# Patient Record
Sex: MALE | Race: WHITE | Employment: OTHER | URBAN - NONMETROPOLITAN AREA
[De-identification: names, ages, dates, MRNs, and addresses within clinical notes are randomized per-mention and may not be internally consistent; named-entity substitution may affect disease eponyms.]

---

## 2021-05-24 LAB
ALBUMIN SERPL-MCNC: 3.8 G/DL
ALP BLD-CCNC: 50 U/L
ALT SERPL-CCNC: 61 U/L
ANION GAP SERPL CALCULATED.3IONS-SCNC: 17 MMOL/L
AST SERPL-CCNC: 42 U/L
BILIRUB SERPL-MCNC: 0.3 MG/DL (ref 0.1–1.4)
BUN BLDV-MCNC: 44 MG/DL
CALCIUM SERPL-MCNC: 8.9 MG/DL
CHLORIDE BLD-SCNC: 106 MMOL/L
CHOLESTEROL, TOTAL: 108 MG/DL
CHOLESTEROL/HDL RATIO: NORMAL
CO2: 24 MMOL/L
CREAT SERPL-MCNC: 2 MG/DL
GFR CALCULATED: 35
GLUCOSE BLD-MCNC: 124 MG/DL
HDLC SERPL-MCNC: 38 MG/DL (ref 35–70)
LDL CHOLESTEROL CALCULATED: 42 MG/DL (ref 0–160)
NONHDLC SERPL-MCNC: NORMAL MG/DL
POTASSIUM SERPL-SCNC: 5.2 MMOL/L
SODIUM BLD-SCNC: 142 MMOL/L
TOTAL PROTEIN: 7.3
TRIGL SERPL-MCNC: 142 MG/DL
VLDLC SERPL CALC-MCNC: 28 MG/DL

## 2021-06-30 PROBLEM — Z87.442 HISTORY OF KIDNEY STONES: Status: ACTIVE | Noted: 2020-12-18

## 2021-06-30 PROBLEM — R35.1 NOCTURIA: Status: ACTIVE | Noted: 2020-12-18

## 2021-06-30 PROBLEM — N40.1 BENIGN PROSTATIC HYPERPLASIA WITH WEAK URINARY STREAM: Status: ACTIVE | Noted: 2020-12-18

## 2021-06-30 PROBLEM — R39.12 BENIGN PROSTATIC HYPERPLASIA WITH WEAK URINARY STREAM: Status: ACTIVE | Noted: 2020-12-18

## 2021-06-30 PROBLEM — N39.0 LOWER URINARY TRACT INFECTIOUS DISEASE: Status: ACTIVE | Noted: 2020-12-28

## 2021-06-30 PROBLEM — R97.20 ELEVATED PSA: Status: ACTIVE | Noted: 2020-12-18

## 2021-06-30 RX ORDER — ASPIRIN 81 MG/1
81 TABLET ORAL DAILY
COMMUNITY

## 2021-06-30 RX ORDER — FINASTERIDE 5 MG/1
5 TABLET, FILM COATED ORAL DAILY
COMMUNITY
Start: 2020-12-18 | End: 2021-10-07 | Stop reason: ALTCHOICE

## 2021-07-01 ENCOUNTER — OFFICE VISIT (OUTPATIENT)
Dept: NEPHROLOGY | Age: 66
End: 2021-07-01
Payer: MEDICARE

## 2021-07-01 VITALS
DIASTOLIC BLOOD PRESSURE: 80 MMHG | HEART RATE: 82 BPM | SYSTOLIC BLOOD PRESSURE: 152 MMHG | WEIGHT: 270 LBS | OXYGEN SATURATION: 96 %

## 2021-07-01 DIAGNOSIS — N18.32 STAGE 3B CHRONIC KIDNEY DISEASE (HCC): Primary | ICD-10-CM

## 2021-07-01 PROBLEM — E29.1 TESTICULAR HYPOFUNCTION: Status: ACTIVE | Noted: 2021-07-01

## 2021-07-01 PROBLEM — E78.00 HYPERCHOLESTEROLEMIA: Status: ACTIVE | Noted: 2021-07-01

## 2021-07-01 PROBLEM — J38.00 VOCAL CORD PALSY: Status: ACTIVE | Noted: 2021-07-01

## 2021-07-01 PROBLEM — N28.9 KIDNEY DISORDER: Status: ACTIVE | Noted: 2021-07-01

## 2021-07-01 PROBLEM — R49.0 HOARSENESS: Status: ACTIVE | Noted: 2021-07-01

## 2021-07-01 PROCEDURE — 99204 OFFICE O/P NEW MOD 45 MIN: CPT | Performed by: INTERNAL MEDICINE

## 2021-07-01 RX ORDER — ALLOPURINOL 100 MG/1
50 TABLET ORAL DAILY
Qty: 15 TABLET | Refills: 5 | Status: SHIPPED | OUTPATIENT
Start: 2021-07-01 | End: 2021-10-21 | Stop reason: SDUPTHER

## 2021-07-01 RX ORDER — LOSARTAN POTASSIUM 100 MG/1
100 TABLET ORAL DAILY
Qty: 30 TABLET | Refills: 5 | Status: SHIPPED | OUTPATIENT
Start: 2021-07-01 | End: 2022-04-07

## 2021-07-01 NOTE — PATIENT INSTRUCTIONS
Stop Lisinopril-hydrochlorothiazide. Start losartan 100 mg daily. Start allopurinol 50 mg daily. bloodwork 2 weeks    Low potassium diet  Low Potassium Diet:        Foods are low in potassium. Plan to eat these every day. But don't eat more than 4 servings a day. A serving equals 1 small piece of fruit or ½ cup.  Fresh fruit: Apples, applesauce, blueberries, grapes, pineapple, plums, watermelon    Canned fruit: Peaches and pears.  Vegetables: Green or wax beans,broccoli, cabbage, carrots, corn, lettuce, radishes, green peas spinach.  Cheese, Pasta, rice, bread     Foods are high in potassium. Don't eat more than 1 serving of these a day. A serving equals 1 small piece of fruit or ½ cup.  Fresh fruit: Blackberries, boysenberries, cherries, grapefruit, strawberries   Vegetables: Asparagus, carrots, beets, corn, turnips, canned tomatoes, white mushrooms, and zucchini.  Milk and yogurt. Don't eat more than 1 cup a day.  Foods are very high in potassium. Avoid these foods.  Fruits: Apricots, bananas, dates, figs, honeydew, raisins, prunes, kiwi fruit, nectarines, oranges, and orange juice, watermelon   Vegetables: Avocados, artichokes, brussels sprouts, potatoes, leafy   green vegetables (such as spinach), winter squash, fresh tomatoes, tomato paste, yams, and dried peas, beans, and lentils.  Clams, chocolate, nuts, seeds, molasses, and sardines. Lower potassium in potatoes by \"leaching\". Boil the potatoes in water and then drain the liquid off. Repeat the rinse twice. Dahlia Perez Do not use     salt substitute or \"lite\" salt,     Sean Lite Salt    No Salt, Nu Salt.   These often are very high in potassium.       Mrs Jovanni Bertrand is ok to use since it does not contain potassium      Potassium Content of Foods   (listing by high to low content)    Food  Serving Size Potassium (mg)   Baked potato (flesh only) one medium 610   Sweet potato, baked one medium 542   Banana, raw  one medium 422 Spinach, cooked ½ C 420   Bailey beans, cooked ½ C 373   Kidney beans, cooked ½ C 371   Lentils, cooked ½ C 366   Navy beans, cooked ½ C 354   Plums, dried, pitted five 350   Artichokes, cooked one medium 343   Mashed potatoes ½ C 343   Edamame/soybeans, green ½ C 338   Tomato, canned  ½ C 325   Raisins ¼ C 299   Tomato, raw one medium 292   Papaya one small 286   Peach one medium 285   Pistachios, dry roasted, salted  1 oz (47 nuts) 285   Pumpkin, cooked, mashed ½ C 282   French fries 10 fries 278   Mushrooms, white, cooked ½ C 278   Beets, cooked ½ C 259   [place] sprouts, cooked ½ C 247   Kiwi one medium 237   Orange, raw one medium 237   Green peas, cooked ½ C 217   Cantaloupe, raw ½ C 214   Pear, raw one medium 212   Almonds, dry roasted 1 oz (24 nuts) 202   Apricot, canned, juice pack ½ C 202   Asparagus, cooked ½ C 202   [place] squash, cooked ½ C 201   Apple, raw with skin one medium 195   Honeydew ½ C 194   Carrots, cooked ½ C 183   Onions, cooked ½ C 175   Spinach, raw 1 C 167   Corn, sweet yellow ½ C 163   Red concepcion pepper ½ C 157   Kale, cooked ½ C 150   Cabbage, cooked ½ C 147   Mustard greens, cooked ½ C 142   [place] ½ C 139   Figs, dried two figs 134   Summer squash, cooked ½ C 126   Grapes 10 grapes 120   Okra, cooked ½ C 108   Bamboo shoots, canned 1 C 108   Celery, raw one stalk 105   Peanut butter, creamy 1 Tbsp 104   Green beans, cooked ½ C 104   Cauliflower, cooked ½ C 91   Mushrooms, shitake, cooked ½ C 88   Watermelon ½ C 85   Cucumber, peeled ½ C 88   Iceberg lettuce 1 C 81   Tomato, sun dried one piece 80   Eggplant, cooked ½ C 69   Pickle one pickle 61   Ketchup 1 Tbsp 60   Radishes one radish 57     5   C=cup, mg=milligrams, oz=ounces, Tbsp=tablespoon    Reference   [employer], [address], [employer]  [employer] Database for Standard Reference, Release 25: Potassium, K (mg) content of selected foods per common measure

## 2021-07-01 NOTE — PROGRESS NOTES
[address] [place] [address] [address] [id number]  Dept: [phone]  Loc: [phone]  Outpatient Consult  [phone]  7/1/2021 10:01 AM EDT        Pt Name:    Shani Stahl  YOB: 1955  Primary Care Physician:  Yaya Simpson MD     Chief Complaint:   Chief Complaint   Patient presents with    New Patient     CKD 3        Background Information/Interval History:   The patient is a 74y.o. year old  male referred by Dr. Howard Briscoe for CKD III. He previously followed with Dr. Fay Ratliff but hasn't seen him in 8 years. Baseline creatinine running 1.6-1.8 mg/dL. Was up to 2.0 in May. K 5.2. He has hx of DM, gout, left renal cyst, BPH, kidney stones. Follows with urology. Has been having a lot of issues with gout lately. Taking colcrys 0.6 mg daily for couple years. Takes prednisone for gout flares. No nsaid use/indomethacin. Uric acid was 10. Gets swelling in legs when has gout flare, otherwise denies swelling. Obesity. A1C 7.3          Allergies:  Ciprofloxacin, Other, Nsaids, and Sulfamethoxazole        Past Medical History:  Past Medical History:   Diagnosis Date    Chronic gout     Chronic kidney disease     Stage III    Colonic polyp     Diverticulosis of colon     Hyperlipidemia     Hyperlipidemia     Hypertension     Malignant essential hypertension     Osteoarthritis     Pyelonephritis     Type II or unspecified type diabetes mellitus without mention of complication, not stated as uncontrolled         Past Surgical History:  No past surgical history on file.      Family History:  Family History   Problem Relation Age of Onset    Diabetes Mother     Diabetes Father     Diabetes Sister     Diabetes Brother         Social History:  Social History     Socioeconomic History    Marital status: Not on file     Spouse name: Not on file    Number of children: Not on file    Years of education: Not on file    Highest education level: Not on file   Occupational History    Not on file   Tobacco Use    Smoking status: Never Smoker    Smokeless tobacco: Never Used   Vaping Use    Vaping Use: Never used   Substance and Sexual Activity    Alcohol use: Yes     Comment: moderate use 2 - 4 drinks weekends    Drug use: No    Sexual activity: Not on file   Other Topics Concern    Not on file   Social History Narrative    Not on file     Social Determinants of Health     Financial Resource Strain:     Difficulty of Paying Living Expenses:    Food Insecurity:     Worried About Running Out of Food in the Last Year:     Ran Out of Food in the Last Year:    Transportation Needs:     Lack of Transportation (Medical):  Lack of Transportation (Non-Medical):    Physical Activity:     Days of Exercise per Week:     Minutes of Exercise per Session:    Stress:     Feeling of Stress :    Social Connections:     Frequency of Communication with Friends and Family:     Frequency of Social Gatherings with Friends and Family:     Attends [employer] Services:     Active Member of Clubs or Organizations:     Attends Club or Organization Meetings:     Marital Status:    Intimate Partner Violence:     Fear of Current or Ex-Partner:     Emotionally Abused:     Physically Abused:     Sexually Abused:         Review of Systems:  Constitutional: no fever or chills  Head: No headaches  Eyes: no blurry vision, no discharge  Ears: no ear pain or hearing changes  Nose: no runny nose or epistaxis  Respiratory: no shortness of breath or cough or sputum production  Cardiovascular: no chest pain, + edema  GI: no nausea, no vomiting or diarrhea  : denies any hematuria, no flank pain  Skin: no rash  Musculoskeletal:+joint pains  Neuro: no tremor, no slurred speech  Psychiatric: stable mood, no depression or insomnia     Home Medications:  Prior to Admission medications    Medication Sig Start Date End Date Taking? Authorizing Provider   NONFORMULARY Take 0.6 mg by mouth daily Goutnil   Yes Historical Provider, MD   Multiple Vitamins-Minerals (VITAMIN D3 COMPLETE PO) Take by mouth   Yes Historical Provider, MD   aspirin 81 MG EC tablet Take 81 tablets by mouth daily   Yes Historical Provider, MD   finasteride (PROSCAR) 5 MG tablet Take 5 mg by mouth daily 12/18/20  Yes Historical Provider, MD   metformin (GLUCOPHAGE) 500 MG tablet Take 1,000 mg by mouth 2 times daily (with meals). Yes Historical Provider, MD   lisinopril-hydrochlorothiazide (PRINZIDE;ZESTORETIC) 20-12.5 MG per tablet Take 1 tablet by mouth daily. Yes Historical Provider, MD   glipiZIDE (GLUCOTROL) 10 MG CR tablet Take 10 mg by mouth daily. Yes Historical Provider, MD   pioglitazone (ACTOS) 15 MG tablet Take 15 mg by mouth daily. Yes Historical Provider, MD   simvastatin (ZOCOR) 20 MG tablet Take 20 mg by mouth nightly. Yes Historical Provider, MD   amLODIPine (NORVASC) 10 MG tablet Take 10 mg by mouth daily. Yes Historical Provider, MD        Physical Examination:  VITALS:  BP (!) 152/80 (Site: Right Upper Arm, Position: Sitting, Cuff Size: Medium Adult)   Pulse 82   Wt 270 lb (122.5 kg)   SpO2 96%   There is no height or weight on file to calculate BMI. Wt Readings from Last 3 Encounters:   07/01/21 270 lb (122.5 kg)   03/08/13 166 lb (75.3 kg)     Constitutional and General Appearance: alert and cooperative with exam, appears comfortable, no distress  Eyes: no icteric sclera, no pallor conjunctiva, no discharge seen from either eye  Ears and Nose: normal external appearance of left and right ear and nose. No active drainage from nose.    Oral: moist oral mucus membranes  Neck: No jugular venous distention, appears symmetric, good ROM  Lungs: Air entry B/L, no crackles or rales, no use of accessory muscles  Heart: regular rate, S1, S2  Extremities: trace LE edema noted  GI: soft, non-tender, no guarding  Skin: no rash seen on exposed extremities  Musculo: moves all extremities  Neuro: no slurred speech, no facial drooping, no asterixis  Psychiatric: Awake, alert, Oriented     Laboratory & Diagnostics:  CBC: No results found for: WBC, HGB, HCT, MCV, PLT  BMP:    Lab Results   Component Value Date     05/24/2021    K 5.2 05/24/2021     05/24/2021    CO2 24 05/24/2021    BUN 44 05/24/2021    CREATININE 2.0 05/24/2021    GLUCOSE 124 05/24/2021      Hepatic:   Lab Results   Component Value Date    AST 42 05/24/2021    ALT 61 05/24/2021    BILITOT 0.3 05/24/2021    ALKPHOS 50 05/24/2021     BNP: No results found for: BNP  Lipids:   Lab Results   Component Value Date    CHOL 108 05/24/2021    HDL 38 05/24/2021     INR: No results found for: INR  URINE: No results found for: NAUR, PROTUR  No results found for: NITRU, COLORU, PHUR, LABCAST, WBCUA, RBCUA, MUCUS, TRICHOMONAS, YEAST, BACTERIA, CLARITYU, SPECGRAV, LEUKOCYTESUR, UROBILINOGEN, BILIRUBINUR, BLOODU, GLUCOSEU, KETUA, AMORPHOUS   Microalbumen/Creatinine ratio:  No components found for: RUCREAT        Impression/Plan:   1. 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 Goals of care include slowing the rate of progression by controlling blood pressures, blood glucose, albuminuria, and avoiding nephrotoxic agents such as NSAIDs and IV contrast.   2. Gout: severe. Uric acid 10. Having frequent flares. Will stop lisinopril-HCTZ as the thiazide could be contributing to hyperuricemia. Pt appears to be on this for kidney stone prevention so I discussed he will be at higher risk for stones for he is ok with this. Advised to notify me if any swelling off thiazide and will start loop diuretic. Start losartan 100 mg daily as has some uricosuric effect. I would also recommend starting low dose of allopurinol 50 mg daily  3. Mild hyperkalemia 5.2. Discussed low K diet.   Since we are stopping the thiazide he is at risk for K increasing so need a repeat bmp in 2 weeks  4. HTN: monitor at home  5. DM, check urine MA  6. Kidney stones, follows with urology  7. Renal cyst  8. obesity    Return in 3 months. Thought process was discussed with the patient.   Thank you for the referral.  Please do not hesitate to contact me if you have any questions or concerns        Deanna Banegas, DO  [facility]

## 2021-07-15 LAB
BUN BLDV-MCNC: 31 MG/DL
CALCIUM SERPL-MCNC: 9.4 MG/DL
CHLORIDE BLD-SCNC: 102 MMOL/L
CO2: 26 MMOL/L
CREAT SERPL-MCNC: 1.5 MG/DL
GFR CALCULATED: 49
GLUCOSE BLD-MCNC: 233 MG/DL
POTASSIUM SERPL-SCNC: 4.9 MMOL/L
SODIUM BLD-SCNC: 139 MMOL/L

## 2021-07-16 ENCOUNTER — TELEPHONE (OUTPATIENT)
Dept: NEPHROLOGY | Age: 66
End: 2021-07-16

## 2021-07-16 NOTE — TELEPHONE ENCOUNTER
----- Message from [id number] Lili Bocanegra DO sent at 7/16/2021 12:59 PM EDT -----  Kidney function improved, potassium is better  ----- Message -----  From: Khadijah Garcia MA  Sent: 7/16/2021   8:21 AM EDT  To: [id number] Lili Bocanegra DO

## 2021-07-23 ENCOUNTER — TELEPHONE (OUTPATIENT)
Dept: NEPHROLOGY | Age: 66
End: 2021-07-23

## 2021-07-23 RX ORDER — METHYLPREDNISOLONE 4 MG/1
TABLET ORAL
Qty: 1 KIT | Refills: 0 | Status: SHIPPED | OUTPATIENT
Start: 2021-07-23 | End: 2021-07-29

## 2021-07-23 NOTE — TELEPHONE ENCOUNTER
Patient called today. He said that you had changed some of his medications and you gave him different medication for his gout. Now he has the worst case of gout in his life!! He has been fighting gout since May. He does not have any prednisone. He said the prednisone did help when he took it. He cannot walk from the chair to the couch. He uses [facility] [place].

## 2021-07-30 PROBLEM — N39.0 LOWER URINARY TRACT INFECTIOUS DISEASE: Status: RESOLVED | Noted: 2020-12-28 | Resolved: 2021-07-30

## 2021-08-02 ENCOUNTER — TELEPHONE (OUTPATIENT)
Dept: NEPHROLOGY | Age: 66
End: 2021-08-02

## 2021-08-02 NOTE — TELEPHONE ENCOUNTER
Today is the first day that he did not have to use crutches to get around. His feet and lower legs have been real swollen. He has gout. He wants to know if there is something else he can try to get the swelling down? Nothing seems to work. Please advise.

## 2021-08-05 ENCOUNTER — OFFICE VISIT (OUTPATIENT)
Dept: NEPHROLOGY | Age: 66
End: 2021-08-05
Payer: MEDICARE

## 2021-08-05 VITALS
DIASTOLIC BLOOD PRESSURE: 66 MMHG | HEART RATE: 75 BPM | SYSTOLIC BLOOD PRESSURE: 116 MMHG | OXYGEN SATURATION: 98 % | WEIGHT: 251 LBS

## 2021-08-05 DIAGNOSIS — N18.32 STAGE 3B CHRONIC KIDNEY DISEASE (HCC): Primary | ICD-10-CM

## 2021-08-05 DIAGNOSIS — M1A.9XX1 CHRONIC GOUT WITH TOPHUS, UNSPECIFIED CAUSE, UNSPECIFIED SITE: ICD-10-CM

## 2021-08-05 PROCEDURE — 1123F ACP DISCUSS/DSCN MKR DOCD: CPT | Performed by: INTERNAL MEDICINE

## 2021-08-05 PROCEDURE — 99213 OFFICE O/P EST LOW 20 MIN: CPT | Performed by: INTERNAL MEDICINE

## 2021-08-05 PROCEDURE — 1036F TOBACCO NON-USER: CPT | Performed by: INTERNAL MEDICINE

## 2021-08-05 PROCEDURE — G8421 BMI NOT CALCULATED: HCPCS | Performed by: INTERNAL MEDICINE

## 2021-08-05 PROCEDURE — 3017F COLORECTAL CA SCREEN DOC REV: CPT | Performed by: INTERNAL MEDICINE

## 2021-08-05 PROCEDURE — 4040F PNEUMOC VAC/ADMIN/RCVD: CPT | Performed by: INTERNAL MEDICINE

## 2021-08-05 PROCEDURE — G8427 DOCREV CUR MEDS BY ELIG CLIN: HCPCS | Performed by: INTERNAL MEDICINE

## 2021-08-05 RX ORDER — METHYLPREDNISOLONE 4 MG/1
TABLET ORAL
Qty: 1 KIT | Refills: 1 | Status: SHIPPED | OUTPATIENT
Start: 2021-08-05 | End: 2021-08-11

## 2021-08-05 NOTE — PROGRESS NOTES
[address] [place] [address] [address] [id number]  Dept: [phone]  Loc: [phone]  Progress Note  8/5/2021 10:23 AM      Pt Name:    Jos Gaming  YOB: 1955  Primary Care Physician:  Finesse Barrios MD     Chief Complaint:   Chief Complaint   Patient presents with    Follow-up     CKD III        History of Present Illness: This is a follow-up visit for CKD III . He has hx DM, gout, left renal cyst, BPH, kidney stones. Follows with urology. Baseline creat 1.6-1.8. Last visit I stopped his thiazide and changed lisinopril to losartan. Also started allopurinol 50 mg daily. Since then he has been having worsening gout. He got a medrol dose pack which helped. He was having swelling but this is better now. He takes a generic type of colcrys 0.6 mg daily that he got over the internet because it was cheaper. Pertinent items are noted in HPI. Past History:  Past Medical History:   Diagnosis Date    Chronic gout     Chronic kidney disease     Stage III    Colonic polyp     Diverticulosis of colon     Hyperlipidemia     Hyperlipidemia     Hypertension     Malignant essential hypertension     Osteoarthritis     Pyelonephritis     Type II or unspecified type diabetes mellitus without mention of complication, not stated as uncontrolled      No past surgical history on file. VITALS:  /66 (Site: Left Upper Arm, Position: Sitting, Cuff Size: Large Adult)   Pulse 75   Wt 251 lb (113.9 kg)   SpO2 98%   Wt Readings from Last 3 Encounters:   08/05/21 251 lb (113.9 kg)   07/01/21 270 lb (122.5 kg)   03/08/13 166 lb (75.3 kg)     There is no height or weight on file to calculate BMI.      General Appearance: alert and cooperative with exam, appears comfortable, no distress  HEENT: EOMI, moist oral mucus membranes  Neck: No jugular venous distention,  Lungs: Air entry B/L, no crackles or rales, no use of accessory muscles  Heart: S1, S2 heard, no rub  GI: soft, non-tender, no guarding  Extremities: trace LE edema noted  Skin: warm, dry  Neurologic: no tremor, no asterixis, no focal neurologic deficits     Medications:  Current Outpatient Medications   Medication Sig Dispense Refill    methylPREDNISolone (MEDROL DOSEPACK) 4 MG tablet Take by mouth for gout flare 1 kit 1    NONFORMULARY Take 0.6 mg by mouth daily Goutnil      Multiple Vitamins-Minerals (VITAMIN D3 COMPLETE PO) Take by mouth      losartan (COZAAR) 100 MG tablet Take 1 tablet by mouth daily 30 tablet 5    allopurinol (ZYLOPRIM) 100 MG tablet Take 0.5 tablets by mouth daily 15 tablet 5    aspirin 81 MG EC tablet Take 81 tablets by mouth daily      finasteride (PROSCAR) 5 MG tablet Take 5 mg by mouth daily      metformin (GLUCOPHAGE) 500 MG tablet Take 1,000 mg by mouth 2 times daily (with meals).  glipiZIDE (GLUCOTROL) 10 MG CR tablet Take 10 mg by mouth daily.  pioglitazone (ACTOS) 15 MG tablet Take 15 mg by mouth daily.  simvastatin (ZOCOR) 20 MG tablet Take 20 mg by mouth nightly.  amLODIPine (NORVASC) 10 MG tablet Take 10 mg by mouth daily. No current facility-administered medications for this visit.         Laboratory & Diagnostics:  CBC: No results found for: WBC, HGB, HCT, MCV, PLT  BMP:    Lab Results   Component Value Date     07/15/2021     05/24/2021    K 4.9 07/15/2021    K 5.2 05/24/2021     07/15/2021     05/24/2021    CO2 26 07/15/2021    CO2 24 05/24/2021    BUN 31 07/15/2021    BUN 44 05/24/2021    CREATININE 1.5 07/15/2021    CREATININE 2.0 05/24/2021    GLUCOSE 233 07/15/2021    GLUCOSE 124 05/24/2021      Hepatic:   Lab Results   Component Value Date    AST 42 05/24/2021    ALT 61 05/24/2021    BILITOT 0.3 05/24/2021    ALKPHOS 50 05/24/2021     BNP: No results found for: BNP  Lipids:   Lab Results   Component Value Date    CHOL 108 05/24/2021    HDL 38 05/24/2021     INR: No results found for: INR  URINE: No results found for: NAUR, PROTUR  No results found for: NITRU, COLORU, PHUR, LABCAST, WBCUA, RBCUA, MUCUS, TRICHOMONAS, YEAST, BACTERIA, CLARITYU, SPECGRAV, LEUKOCYTESUR, UROBILINOGEN, BILIRUBINUR, BLOODU, GLUCOSEU, KETUA, AMORPHOUS   Microalbumen/Creatinine ratio:  No components found for: RUCREAT        Impression/Plan:   1. 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 Goals of care include slowing rate of progression by controlling blood pressure, blood glucoses and albuminuria and by avoiding nephrotoxins such as NSAIDs and IV contrast.    2. Severe gout: recent flares likely precipitated from initiation of allopurinol. Continue with colcrys 0.6 mg daily. rx'd medrol dose pack to take at beginning of flares. Will continue allopurinol for now. Also recommended referral to rheumatology for assistance  3. HTN: stable  4. Mild hyperkalemia low K diet  5. HTN  6. DM  7. Kidney stones  8. Renal cyst    Bloodwork and medications were reviewed and plan of care discussed with the patient. Return to clinic in at next scheduled appt  or sooner if the need arises.       Manda Jackson,   [facility]

## 2021-08-05 NOTE — PATIENT INSTRUCTIONS
Medrol dose pack when you get a gout flare    Purine-Restricted Diet:     Purines are substances that are found in some foods. Your body turns purines into uric acid. High levels of uric acid can cause gout. You may be able to help control the amount of uric acid in your body by limiting high-purine foods in your diet. Drink plenty of fluids, particularly water. Fluids can help remove uric acid from your body. Aim for 8 to 16 glasses a day. A glass is 8 ounces (237 milliliter). Plan your diet around foods that are low in purines and are safe for you to eat. These foods include:   Green vegetables and tomatoes. Fruits and 100% fruit juices. Breads, rice, and cereals that are not whole-grain. Chocolate and cocoa. Coffee, tea, and carbonated beverages. Peanut butter and nuts. Cheese, milk, milk products, and eggs. Fat and oils, in moderation. Popcorn. Vinegar, olives, pickles, and relishes. Gelatin desserts. Cakes, cookies, and sweets, in moderation. You can eat certain foods that are medium-high in purines, but eat them only once in a while. These foods include:   Asparagus, cauliflower, spinach, mushrooms, and green peas. Fish and seafood (other than very high-purine seafood). Oatmeal, wheat bran, and wheat germ. Limit very high-purine foods, including:   Organ meats, such as liver, kidneys, heart, and brains. Meats, including humphrey, beef, pork, and lamb. Game meats-Goose, duck, partridge,veal, venison  Anchovies, sardines, herring, mackerel, and scallops. Gravy. Legumes, such as dried beans and dried peas. Sweetbreads  Beer.   High fat foods and drink  High fructose corn syrup

## 2021-09-14 LAB
ALBUMIN SERPL-MCNC: 4.2 G/DL
ALP BLD-CCNC: 57 U/L
ALT SERPL-CCNC: 49 U/L
ANION GAP SERPL CALCULATED.3IONS-SCNC: NORMAL MMOL/L
AST SERPL-CCNC: 33 U/L
AVERAGE GLUCOSE: NORMAL
BILIRUB SERPL-MCNC: 0.4 MG/DL (ref 0.1–1.4)
BUN BLDV-MCNC: 20 MG/DL
CALCIUM SERPL-MCNC: 9.4 MG/DL
CHLORIDE BLD-SCNC: 106 MMOL/L
CHOLESTEROL, TOTAL: 97 MG/DL
CHOLESTEROL/HDL RATIO: NORMAL
CO2: 25 MMOL/L
CREAT SERPL-MCNC: 1.4 MG/DL
GFR CALCULATED: 54
GLUCOSE BLD-MCNC: 103 MG/DL
HBA1C MFR BLD: 6.6 %
HDLC SERPL-MCNC: 37 MG/DL (ref 35–70)
LDL CHOLESTEROL CALCULATED: 39 MG/DL (ref 0–160)
NONHDLC SERPL-MCNC: NORMAL MG/DL
POTASSIUM SERPL-SCNC: 4.6 MMOL/L
SODIUM BLD-SCNC: 143 MMOL/L
TOTAL PROTEIN: 7.2
TRIGL SERPL-MCNC: 105 MG/DL
URIC ACID: 6.4
VLDLC SERPL CALC-MCNC: 21 MG/DL

## 2021-10-01 LAB
BASOPHILS ABSOLUTE: NORMAL
BASOPHILS RELATIVE PERCENT: NORMAL
BUN BLDV-MCNC: 31 MG/DL
CALCIUM SERPL-MCNC: 9.1 MG/DL
CHLORIDE BLD-SCNC: 104 MMOL/L
CO2: 28 MMOL/L
CREAT SERPL-MCNC: 1.7 MG/DL
CREATININE, URINE: 143.8
EOSINOPHILS ABSOLUTE: NORMAL
EOSINOPHILS RELATIVE PERCENT: NORMAL
GFR CALCULATED: 43
GLUCOSE BLD-MCNC: 82 MG/DL
HCT VFR BLD CALC: 44.4 % (ref 41–53)
HEMOGLOBIN: 14 G/DL (ref 13.5–17.5)
LYMPHOCYTES ABSOLUTE: NORMAL
LYMPHOCYTES RELATIVE PERCENT: NORMAL
MCH RBC QN AUTO: NORMAL PG
MCHC RBC AUTO-ENTMCNC: NORMAL G/DL
MCV RBC AUTO: NORMAL FL
MICROALBUMIN/CREAT 24H UR: 25.9 MG/G{CREAT}
MICROALBUMIN/CREAT UR-RTO: 18
MONOCYTES ABSOLUTE: NORMAL
MONOCYTES RELATIVE PERCENT: NORMAL
NEUTROPHILS ABSOLUTE: NORMAL
NEUTROPHILS RELATIVE PERCENT: NORMAL
PLATELET # BLD: 269 K/ΜL
PMV BLD AUTO: NORMAL FL
POTASSIUM SERPL-SCNC: 4.5 MMOL/L
PROSTATE SPECIFIC ANTIGEN FREE: NORMAL
PROSTATE SPECIFIC ANTIGEN PERCENT FREE: NORMAL
PROSTATE SPECIFIC ANTIGEN: 18.7 NG/ML
RBC # BLD: 4.75 10^6/ΜL
SODIUM BLD-SCNC: 142 MMOL/L
URIC ACID: 6.7
WBC # BLD: 7.24 10^3/ML

## 2021-10-05 ENCOUNTER — TELEPHONE (OUTPATIENT)
Dept: NEPHROLOGY | Age: 66
End: 2021-10-05

## 2021-10-05 RX ORDER — METHYLPREDNISOLONE 4 MG/1
TABLET ORAL
Qty: 1 KIT | Refills: 1 | Status: SHIPPED | OUTPATIENT
Start: 2021-10-05 | End: 2021-10-07 | Stop reason: SDUPTHER

## 2021-10-05 RX ORDER — METHYLPREDNISOLONE 4 MG/1
TABLET ORAL
COMMUNITY
Start: 2021-09-24 | End: 2021-10-05 | Stop reason: SDUPTHER

## 2021-10-07 ENCOUNTER — OFFICE VISIT (OUTPATIENT)
Dept: NEPHROLOGY | Age: 66
End: 2021-10-07
Payer: MEDICARE

## 2021-10-07 VITALS
WEIGHT: 236 LBS | OXYGEN SATURATION: 93 % | SYSTOLIC BLOOD PRESSURE: 120 MMHG | DIASTOLIC BLOOD PRESSURE: 78 MMHG | HEART RATE: 75 BPM

## 2021-10-07 DIAGNOSIS — L65.9 HAIR LOSS: ICD-10-CM

## 2021-10-07 DIAGNOSIS — M1A.9XX1 CHRONIC GOUT WITH TOPHUS, UNSPECIFIED CAUSE, UNSPECIFIED SITE: ICD-10-CM

## 2021-10-07 DIAGNOSIS — N18.32 STAGE 3B CHRONIC KIDNEY DISEASE (HCC): Primary | ICD-10-CM

## 2021-10-07 PROCEDURE — 1123F ACP DISCUSS/DSCN MKR DOCD: CPT | Performed by: INTERNAL MEDICINE

## 2021-10-07 PROCEDURE — 1036F TOBACCO NON-USER: CPT | Performed by: INTERNAL MEDICINE

## 2021-10-07 PROCEDURE — G8421 BMI NOT CALCULATED: HCPCS | Performed by: INTERNAL MEDICINE

## 2021-10-07 PROCEDURE — G8484 FLU IMMUNIZE NO ADMIN: HCPCS | Performed by: INTERNAL MEDICINE

## 2021-10-07 PROCEDURE — G8427 DOCREV CUR MEDS BY ELIG CLIN: HCPCS | Performed by: INTERNAL MEDICINE

## 2021-10-07 PROCEDURE — 99213 OFFICE O/P EST LOW 20 MIN: CPT | Performed by: INTERNAL MEDICINE

## 2021-10-07 PROCEDURE — 3017F COLORECTAL CA SCREEN DOC REV: CPT | Performed by: INTERNAL MEDICINE

## 2021-10-07 PROCEDURE — 4040F PNEUMOC VAC/ADMIN/RCVD: CPT | Performed by: INTERNAL MEDICINE

## 2021-10-07 RX ORDER — DUTASTERIDE 0.5 MG/1
0.5 CAPSULE, LIQUID FILLED ORAL DAILY
COMMUNITY
Start: 2021-10-04

## 2021-10-07 RX ORDER — METHYLPREDNISOLONE 4 MG/1
TABLET ORAL
Qty: 1 KIT | Refills: 2 | Status: SHIPPED | OUTPATIENT
Start: 2021-10-07 | End: 2021-10-21 | Stop reason: SDUPTHER

## 2021-10-07 NOTE — PATIENT INSTRUCTIONS
Check thyroid blood test.     Purine-Restricted Diet:     Purines are substances that are found in some foods. Your body turns purines into uric acid. High levels of uric acid can cause gout. You may be able to help control the amount of uric acid in your body by limiting high-purine foods in your diet. Drink plenty of fluids, particularly water. Fluids can help remove uric acid from your body. Aim for 8 to 16 glasses a day. A glass is 8 ounces (237 milliliter). Plan your diet around foods that are low in purines and are safe for you to eat. These foods include:   Green vegetables and tomatoes. Fruits and 100% fruit juices. Breads, rice, and cereals that are not whole-grain. Chocolate and cocoa. Coffee, tea, and carbonated beverages. Peanut butter and nuts. Cheese, milk, milk products, and eggs. Fat and oils, in moderation. Popcorn. Vinegar, olives, pickles, and relishes. Gelatin desserts. Cakes, cookies, and sweets, in moderation. You can eat certain foods that are medium-high in purines, but eat them only once in a while. These foods include:   Asparagus, cauliflower, spinach, mushrooms, and green peas. Fish and seafood (other than very high-purine seafood). Oatmeal, wheat bran, and wheat germ. Limit very high-purine foods, including:   Organ meats, such as liver, kidneys, heart, and brains. Meats, including humphrey, beef, pork, and lamb. Game meats-Goose, duck, partridge,veal, venison  Anchovies, sardines, herring, mackerel, and scallops. Gravy. Legumes, such as dried beans and dried peas. Sweetbreads  Beer.   High fat foods and drink  High fructose corn syrup

## 2021-10-07 NOTE — PROGRESS NOTES
[address] [facility]  [address] [place] [address]  Dept: [phone]  Loc: [phone]  Progress Note  10/7/2021 11:30 AM      Pt Name:    Anamaria Nelson  YOB: 1955  Primary Care Physician:  Carol Ann Nguyen MD     Chief Complaint:   Chief Complaint   Patient presents with    Chronic Kidney Disease     STAGE 3        History of Present Illness: This is a follow-up visit for CKD III . He has hx DM, gout, left renal cyst, BPH, kidney stones. Follows with urology. Baseline creat 1.6-1.8. He has bad gout. I stopped his thiazide previously and changed lisinopril to losartan. Also started allopurinol 50 mg daily  Uric acid has improved from 10.0 to 6.7. He takes a generic type of colcrys 0.6 mg daily that he got over the internet because it was cheaper. Continues to have gout flares every month requiring steroids. He is losing weight about 15-20 pounds since May and also having hair loss. Pertinent items are noted in HPI. Past History:  Past Medical History:   Diagnosis Date    Chronic gout     Chronic kidney disease     Stage III    Colonic polyp     Diverticulosis of colon     Hyperlipidemia     Hyperlipidemia     Hypertension     Malignant essential hypertension     Osteoarthritis     Pyelonephritis     Type II or unspecified type diabetes mellitus without mention of complication, not stated as uncontrolled      No past surgical history on file. VITALS:  /78 (Site: Right Upper Arm, Position: Sitting, Cuff Size: Large Adult)   Pulse 75   Wt 236 lb (107 kg)   SpO2 93%   Wt Readings from Last 3 Encounters:   10/07/21 236 lb (107 kg)   08/05/21 251 lb (113.9 kg)   07/01/21 270 lb (122.5 kg)     There is no height or weight on file to calculate BMI.      General Appearance: alert and cooperative with exam, appears comfortable, no distress  HEENT: EOMI, moist oral mucus membranes  Neck: No jugular venous distention,  Lungs: Air entry B/L, no crackles or rales, no use of accessory muscles  Heart: S1, S2 heard, no rub  GI: soft, non-tender, no guarding  Extremities: trace LE edema noted  Skin: warm, dry  Neurologic: no tremor, no asterixis, no focal neurologic deficits     Medications:  Current Outpatient Medications   Medication Sig Dispense Refill    dutasteride (AVODART) 0.5 MG capsule Take 0.5 mg by mouth daily      methylPREDNISolone (MEDROL DOSEPACK) 4 MG tablet TAKE AS DIRECTED PER PACKAGE FOR GOUT FLARE 1 kit 2    NONFORMULARY Take 0.6 mg by mouth daily Goutnil      Multiple Vitamins-Minerals (VITAMIN D3 COMPLETE PO) Take by mouth      losartan (COZAAR) 100 MG tablet Take 1 tablet by mouth daily 30 tablet 5    allopurinol (ZYLOPRIM) 100 MG tablet Take 0.5 tablets by mouth daily 15 tablet 5    aspirin 81 MG EC tablet Take 81 tablets by mouth daily      metformin (GLUCOPHAGE) 500 MG tablet Take 1,000 mg by mouth 2 times daily (with meals).  glipiZIDE (GLUCOTROL) 10 MG CR tablet Take 10 mg by mouth daily.  pioglitazone (ACTOS) 15 MG tablet Take 15 mg by mouth daily.  simvastatin (ZOCOR) 20 MG tablet Take 20 mg by mouth nightly.  amLODIPine (NORVASC) 10 MG tablet Take 10 mg by mouth daily. No current facility-administered medications for this visit.         Laboratory & Diagnostics:  CBC:   Lab Results   Component Value Date    WBC 7.24 10/01/2021    HGB 14.0 10/01/2021    HCT 44.4 10/01/2021     10/01/2021     BMP:    Lab Results   Component Value Date     10/01/2021     09/14/2021     07/15/2021    K 4.5 10/01/2021    K 4.6 09/14/2021    K 4.9 07/15/2021     10/01/2021     09/14/2021     07/15/2021    CO2 28 10/01/2021    CO2 25 09/14/2021    CO2 26 07/15/2021    BUN 31 10/01/2021    BUN 20 09/14/2021    BUN 31 07/15/2021    CREATININE 1.7 10/01/2021    CREATININE 1.4 09/14/2021    CREATININE 1.5 07/15/2021    GLUCOSE 82 10/01/2021    GLUCOSE 103 09/14/2021    GLUCOSE 233 07/15/2021      Hepatic:   Lab Results   Component Value Date    AST 33 09/14/2021    AST 42 05/24/2021    ALT 49 09/14/2021    ALT 61 05/24/2021    BILITOT 0.4 09/14/2021    BILITOT 0.3 05/24/2021    ALKPHOS 57 09/14/2021    ALKPHOS 50 05/24/2021     BNP: No results found for: BNP  Lipids:   Lab Results   Component Value Date    CHOL 97 09/14/2021    HDL 37 09/14/2021     INR: No results found for: INR  URINE: No results found for: NAUR, PROTUR  No results found for: NITRU, COLORU, PHUR, LABCAST, WBCUA, RBCUA, MUCUS, TRICHOMONAS, YEAST, BACTERIA, CLARITYU, SPECGRAV, LEUKOCYTESUR, UROBILINOGEN, BILIRUBINUR, BLOODU, GLUCOSEU, KETUA, AMORPHOUS   Microalbumen/Creatinine ratio:  No components found for: RUCREAT        Impression/Plan:   1. 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 Goals of care include slowing rate of progression by controlling blood pressure, blood glucoses and albuminuria and by avoiding nephrotoxins such as NSAIDs and IV contrast.    2. Severe gout: uric acid improved but still having severe flares every month. On allopurinol 50 mg daily and takes a colcrys 0.6 mg that he got over the internet b/c was cheaper. Hesitant to increase allopurinol due to frequent flares. Has been referred to rheumatology for assistance will reach out for any further recs until able to see them  3. HTN: stable  4. Mild hyperkalemia low K diet  5. HTN  6. DM  7. Kidney stones  8. Renal cyst  9. Weight loss/hair loss: check TSH    Bloodwork and medications were reviewed and plan of care discussed with the patient. Return to clinic in in 6 months or sooner if the need arises.       William Verdugo,   [facility]

## 2021-10-08 ENCOUNTER — TELEPHONE (OUTPATIENT)
Dept: NEPHROLOGY | Age: 66
End: 2021-10-08

## 2021-10-08 NOTE — TELEPHONE ENCOUNTER
----- Message from [id number] Lili Bocanegra DO sent at 10/8/2021 12:03 PM EDT -----  Thyroid test is normal  ----- Message -----  From: Johanny Floyd MA  Sent: 10/8/2021   9:54 AM EDT  To: [id number] Lili Bocanegra DO

## 2021-10-11 ENCOUNTER — TELEPHONE (OUTPATIENT)
Dept: NEPHROLOGY | Age: 66
End: 2021-10-11

## 2021-10-11 NOTE — TELEPHONE ENCOUNTER
I had pt increased to 100 mg daily. Is that what you want him to do? Do you want any repeat labs on him?

## 2021-10-11 NOTE — TELEPHONE ENCOUNTER
----- Message from Jovany Taylor DO sent at 10/8/2021  1:08 PM EDT -----  [id number] Aidee Guajardo I was hesitant to increase the allopurinol since he is still having frequent flares with it and he is already taking daily colchine and prn steroids for flares. But we will try that and see how he does. Thank you!  ----- Message -----  From: Cleopatra Feng DO  Sent: 10/8/2021  12:37 PM EDT  To: Jovany Taylor DO    Current guidelines note that the allopurinol can be titrated to 800mg - in increments of 50-100mg every 2 weeks with stable kidney function tests and monthly in those with CKD. Repeat of labs Repeat labs (lfts, creatinine and uric acid ) after the 2-4 weeks of increased dose can be used to evavluate for side effects and improvement of the uric acid. Use of a bridging agent such as NSAID, Colchicine or prednisone can help prevent flares. If the patient is black of other high risk population [id number] to evaluate the risk for allopurinol hypersensitivity rxn.      ----- Message -----  From: Jovany Taylor DO  Sent: 10/7/2021  11:32 AM EDT  To: Cleopatra Feng DO    Hi, you don't see this keisha until April as a new patient but was wondering if you have any advice. This poor keisha has terrible gout. Uric acid has improved to 6.7 from 10 on allopurinol 50 mg daily but gout flares persistent monthly and he takes steroids every month. He also found colcrys 0.6 mg that he bought over the internet bc the rx was too expensive for him. Any other recs would be appreciated until he is able to get in to see you. Thanks!

## 2021-10-21 RX ORDER — METHYLPREDNISOLONE 4 MG/1
TABLET ORAL
Qty: 1 KIT | Refills: 2 | Status: SHIPPED | OUTPATIENT
Start: 2021-10-21 | End: 2022-02-11 | Stop reason: SDUPTHER

## 2021-10-21 RX ORDER — ALLOPURINOL 100 MG/1
100 TABLET ORAL DAILY
Qty: 90 TABLET | Refills: 3 | Status: SHIPPED | OUTPATIENT
Start: 2021-10-21 | End: 2022-02-10

## 2022-02-11 ENCOUNTER — TELEPHONE (OUTPATIENT)
Dept: NEPHROLOGY | Age: 67
End: 2022-02-11

## 2022-02-11 RX ORDER — METHYLPREDNISOLONE 4 MG/1
TABLET ORAL
Qty: 1 KIT | Refills: 1 | Status: SHIPPED | OUTPATIENT
Start: 2022-02-11

## 2022-03-15 LAB
BASOPHILS ABSOLUTE: 0.06 /ΜL
BASOPHILS RELATIVE PERCENT: 0.9 %
BUN BLDV-MCNC: 23 MG/DL
CALCIUM SERPL-MCNC: 9.6 MG/DL
CHLORIDE BLD-SCNC: 105 MMOL/L
CO2: 23 MMOL/L
CREAT SERPL-MCNC: 1.4 MG/DL
EOSINOPHILS ABSOLUTE: 0.36 /ΜL
EOSINOPHILS RELATIVE PERCENT: 5.3 %
GFR CALCULATED: 55
GLUCOSE BLD-MCNC: 76 MG/DL
HCT VFR BLD CALC: 44.5 % (ref 41–53)
HEMOGLOBIN: 14.4 G/DL (ref 13.5–17.5)
LYMPHOCYTES ABSOLUTE: 2.36 /ΜL
LYMPHOCYTES RELATIVE PERCENT: 34.9 %
MCH RBC QN AUTO: 14.4 PG
MCHC RBC AUTO-ENTMCNC: 44.5 G/DL
MCV RBC AUTO: 92.1 FL
MONOCYTES ABSOLUTE: 0.66 /ΜL
MONOCYTES RELATIVE PERCENT: 9.8 %
NEUTROPHILS ABSOLUTE: 3.31 /ΜL
NEUTROPHILS RELATIVE PERCENT: 49 %
PHOSPHORUS: 3 MG/DL
PLATELET # BLD: 209 K/ΜL
PMV BLD AUTO: 11.9 FL
POTASSIUM SERPL-SCNC: 4.4 MMOL/L
PTH INTACT: 35.1
RBC # BLD: 4.83 10^6/ΜL
SODIUM BLD-SCNC: 143 MMOL/L
URIC ACID: 6.2
VITAMIN D 25-HYDROXY: 68.3
VITAMIN D2, 25 HYDROXY: NORMAL
VITAMIN D3,25 HYDROXY: NORMAL
WBC # BLD: 6.76 10^3/ML

## 2022-04-07 ENCOUNTER — OFFICE VISIT (OUTPATIENT)
Dept: NEPHROLOGY | Age: 67
End: 2022-04-07
Payer: MEDICARE

## 2022-04-07 VITALS
DIASTOLIC BLOOD PRESSURE: 72 MMHG | HEART RATE: 76 BPM | SYSTOLIC BLOOD PRESSURE: 136 MMHG | WEIGHT: 268 LBS | OXYGEN SATURATION: 98 %

## 2022-04-07 DIAGNOSIS — N18.32 STAGE 3B CHRONIC KIDNEY DISEASE (HCC): Primary | ICD-10-CM

## 2022-04-07 PROCEDURE — G8427 DOCREV CUR MEDS BY ELIG CLIN: HCPCS | Performed by: INTERNAL MEDICINE

## 2022-04-07 PROCEDURE — 1036F TOBACCO NON-USER: CPT | Performed by: INTERNAL MEDICINE

## 2022-04-07 PROCEDURE — 4040F PNEUMOC VAC/ADMIN/RCVD: CPT | Performed by: INTERNAL MEDICINE

## 2022-04-07 PROCEDURE — 1123F ACP DISCUSS/DSCN MKR DOCD: CPT | Performed by: INTERNAL MEDICINE

## 2022-04-07 PROCEDURE — 3017F COLORECTAL CA SCREEN DOC REV: CPT | Performed by: INTERNAL MEDICINE

## 2022-04-07 PROCEDURE — G8421 BMI NOT CALCULATED: HCPCS | Performed by: INTERNAL MEDICINE

## 2022-04-07 PROCEDURE — 99213 OFFICE O/P EST LOW 20 MIN: CPT | Performed by: INTERNAL MEDICINE

## 2022-04-07 NOTE — PROGRESS NOTES
[address] [facility]  [address] [facility] [address] [place] [address]  Dept: [phone]  Loc: [phone]  Progress Note  4/7/2022 10:55 AM      Pt Name:    Mike Anderson  YOB: 1955  Primary Care Physician:  Jaret Wilson MD     Chief Complaint:   Chief Complaint   Patient presents with    Follow-up     CKD III        History of Present Illness: This is a follow-up visit for CKD III . He has hx DM, gout, left renal cyst, BPH, kidney stones. Follows with urology. Continues to have issues with gout flares multiple times a month. His uric acid is much better down to 6.2 and was > 10 initially. On allopurinol 100 mg daily. Takes a generic colcrys that he gets OTC b/c colcyrs was too expensive. Takes steroids PRN when he gets a flare. He is seeing Dr. Dilip Santiago next week for a consult. Pertinent items are noted in HPI. Past History:  Past Medical History:   Diagnosis Date    Chronic gout     Chronic kidney disease     Stage III    Colonic polyp     Diverticulosis of colon     Hyperlipidemia     Hyperlipidemia     Hypertension     Malignant essential hypertension     Osteoarthritis     Pyelonephritis     Type II or unspecified type diabetes mellitus without mention of complication, not stated as uncontrolled      No past surgical history on file. VITALS:  /72 (Site: Right Upper Arm, Position: Sitting, Cuff Size: Large Adult)   Pulse 76   Wt 268 lb (121.6 kg)   SpO2 98%   Wt Readings from Last 3 Encounters:   04/07/22 268 lb (121.6 kg)   10/07/21 236 lb (107 kg)   08/05/21 251 lb (113.9 kg)     There is no height or weight on file to calculate BMI.      General Appearance: alert and cooperative with exam, appears comfortable, no distress  HEENT: EOMI, moist oral mucus membranes  Neck: No jugular venous distention,  Lungs: Air entry B/L, no crackles or rales, no use of accessory muscles  Heart: S1, S2 heard, no rub  GI: soft, non-tender, no guarding  Extremities: trace LE edema noted  Skin: warm, dry  Neurologic: no tremor, no asterixis, no focal neurologic deficits     Medications:  Current Outpatient Medications   Medication Sig Dispense Refill    allopurinol (ZYLOPRIM) 100 MG tablet Take 1 tablet by mouth daily 15 tablet 5    dutasteride (AVODART) 0.5 MG capsule Take 0.5 mg by mouth daily      NONFORMULARY Take 0.6 mg by mouth daily Goutnil      Multiple Vitamins-Minerals (VITAMIN D3 COMPLETE PO) Take by mouth      losartan (COZAAR) 100 MG tablet Take 1 tablet by mouth daily 30 tablet 5    aspirin 81 MG EC tablet Take 81 tablets by mouth daily      metformin (GLUCOPHAGE) 500 MG tablet Take 1,000 mg by mouth 2 times daily (with meals).  glipiZIDE (GLUCOTROL) 10 MG CR tablet Take 10 mg by mouth daily.  pioglitazone (ACTOS) 15 MG tablet Take 15 mg by mouth daily.  simvastatin (ZOCOR) 20 MG tablet Take 20 mg by mouth nightly.  amLODIPine (NORVASC) 10 MG tablet Take 10 mg by mouth daily.  methylPREDNISolone (MEDROL DOSEPACK) 4 MG tablet TAKE AS DIRECTED PER PACKAGE FOR GOUT FLARE (Patient not taking: Reported on 4/7/2022) 1 kit 1     No current facility-administered medications for this visit.         Laboratory & Diagnostics:  CBC:   Lab Results   Component Value Date    WBC 6.76 03/15/2022    HGB 14.4 03/15/2022    HCT 44.5 03/15/2022    MCV 92.1 03/15/2022     03/15/2022     BMP:    Lab Results   Component Value Date     03/15/2022     10/01/2021     09/14/2021    K 4.4 03/15/2022    K 4.5 10/01/2021    K 4.6 09/14/2021     03/15/2022     10/01/2021     09/14/2021    CO2 23 03/15/2022    CO2 28 10/01/2021    CO2 25 09/14/2021    BUN 23 03/15/2022    BUN 31 10/01/2021    BUN 20 09/14/2021    CREATININE 1.4 03/15/2022    CREATININE 1.7 10/01/2021    CREATININE 1.4 09/14/2021    GLUCOSE 76 03/15/2022    GLUCOSE 82 10/01/2021    GLUCOSE 103 09/14/2021      Hepatic:   Lab Results   Component Value Date    AST 33 09/14/2021    AST 42 05/24/2021    ALT 49 09/14/2021    ALT 61 05/24/2021    BILITOT 0.4 09/14/2021    BILITOT 0.3 05/24/2021    ALKPHOS 57 09/14/2021    ALKPHOS 50 05/24/2021     BNP: No results found for: BNP  Lipids:   Lab Results   Component Value Date    CHOL 97 09/14/2021    HDL 37 09/14/2021     INR: No results found for: INR  URINE: No results found for: NAUR, PROTUR  No results found for: NITRU, COLORU, PHUR, LABCAST, WBCUA, RBCUA, MUCUS, TRICHOMONAS, YEAST, BACTERIA, CLARITYU, SPECGRAV, LEUKOCYTESUR, UROBILINOGEN, BILIRUBINUR, BLOODU, GLUCOSEU, KETUA, AMORPHOUS   Microalbumen/Creatinine ratio:  No components found for: RUCREAT        Impression/Plan:   1. 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 Goals of care include slowing rate of progression by controlling blood pressure, blood glucoses and albuminuria and by avoiding nephrotoxins such as NSAIDs and IV contrast.    2. Gout, uric acid much better with allopurinol but still having frequent flares. Sees rheumatology next week  3. HTN: stable  4. Lytes: stable  5. DM, no proteinuria  6. Kidney stones  7. Renal cyst      Bloodwork and medications were reviewed and plan of care discussed with the patient. Return to clinic in in 6 months or sooner if the need arises.       Leisa Manual, DO  [facility]

## 2022-06-10 ENCOUNTER — TELEPHONE (OUTPATIENT)
Dept: NEPHROLOGY | Age: 67
End: 2022-06-10

## 2022-06-10 NOTE — TELEPHONE ENCOUNTER
Renetta Riojas called he is having a gout attack. He wants you to call him in some prednisone. He has had symptoms for 2 days. Also he wants referred to a different rheumatologist. He said he could not find Dr. Kostas Avila office and when he finally did he was 5 minutes late and they would not see him. He did not reschedule because Dr. Kostas Avila  asked why we did not refer him to someone in [facility]. He said he did not know. He will call Dr. Gael Horton to get a prescription for the prednisone.

## 2022-06-10 NOTE — TELEPHONE ENCOUNTER
There is not a rheumatologist in [facility] that I am aware of. There is a different rheumatologist in [facility].[place], Dr. Torres Drilling if he'd rather try there. Otherwise if he does not want to come to [facility].[place] I would have him ask his PCP if there is a closer rheumatologist to where he lives. And I am under the impression that he is getting the prednisone from his PCP then so I will not send it in.

## 2022-06-13 NOTE — TELEPHONE ENCOUNTER
I called [facility] to see who a rheumatologist in the area is and they do not know of anyone.

## 2022-06-13 NOTE — TELEPHONE ENCOUNTER
I left a message for Dr. Belle Serve nurses to see if they could tell me who they refer to for Rheumatology.

## 2022-06-15 NOTE — TELEPHONE ENCOUNTER
Shakir Carvalho called. He said that he is willing to go to a doctor as long as he can find the office. He was not happy when Dr. Valeriy Fragoso would not see him after he drove [place] and had a hard time finding the office. He is suppose to call his insurance and see who is in his network that is close to him.

## 2022-06-15 NOTE — TELEPHONE ENCOUNTER
Received a call from Dr. Nathaneil Cowden office.  Was told to have the patient call his insurance and see who is in his network for rheumatologist.

## 2022-10-26 RX ORDER — ALLOPURINOL 100 MG/1
100 TABLET ORAL DAILY
Qty: 90 TABLET | Refills: 0 | Status: SHIPPED | OUTPATIENT
Start: 2022-10-26 | End: 2023-01-24

## 2022-11-28 LAB
ALBUMIN SERPL-MCNC: 3.8 G/DL
ALP BLD-CCNC: 68 U/L
ALT SERPL-CCNC: 36 U/L
ANION GAP SERPL CALCULATED.3IONS-SCNC: 16 MMOL/L
AST SERPL-CCNC: 21 U/L
BILIRUB SERPL-MCNC: 0.3 MG/DL (ref 0.1–1.4)
BUN BLDV-MCNC: 26 MG/DL
CALCIUM SERPL-MCNC: 9.5 MG/DL
CHLORIDE BLD-SCNC: 105 MMOL/L
CO2: 25 MMOL/L
CREAT SERPL-MCNC: 1.4 MG/DL
GFR CALCULATED: 53
GLUCOSE BLD-MCNC: 145 MG/DL
POTASSIUM SERPL-SCNC: 4.5 MMOL/L
SODIUM BLD-SCNC: 141 MMOL/L
TOTAL PROTEIN: 7
URIC ACID: NORMAL

## 2022-11-29 LAB
AVERAGE GLUCOSE: 145
CHOLESTEROL, TOTAL: 103 MG/DL
CHOLESTEROL/HDL RATIO: NORMAL
CREATININE, URINE: 226.3
HBA1C MFR BLD: 6.9 %
HDLC SERPL-MCNC: 46 MG/DL (ref 35–70)
LDL CHOLESTEROL CALCULATED: 41 MG/DL (ref 0–160)
MICROALBUMIN/CREAT 24H UR: 178.4 MG/G{CREAT}
MICROALBUMIN/CREAT UR-RTO: 78.8
NONHDLC SERPL-MCNC: NORMAL MG/DL
TRIGL SERPL-MCNC: 81 MG/DL
VLDLC SERPL CALC-MCNC: 16 MG/DL

## 2023-01-23 RX ORDER — ALLOPURINOL 100 MG/1
TABLET ORAL
Qty: 90 TABLET | Refills: 0 | OUTPATIENT
Start: 2023-01-23